# Patient Record
(demographics unavailable — no encounter records)

---

## 2024-11-01 NOTE — DISCUSSION/SUMMARY
[de-identified] : I had an in-depth discussion with the patient regarding his symptoms. On today's exam, his strength has improved, although he still reports intermittent imbalance and leg weakness. We again discussed the role of surgery, including risks and benefits. Overall, he reports improvement since the last visit. He will consider his options and inform me of how he would like to proceed.

## 2024-11-01 NOTE — END OF VISIT
[FreeTextEntry3] : All medical record entries made by the Scribe were at my, Dean Kunz MD, direction and personally dictated by me on 11/01/2024. I have reviewed the chart and agree that the record accurately reflects my personal performance of the history, physical exam, assessment and plan. I have also personally directed, reviewed, and agreed with the chart. [Time Spent: ___ minutes] : I have spent [unfilled] minutes of time on the encounter which excludes teaching and separately reported services.

## 2024-11-01 NOTE — ADDENDUM
[FreeTextEntry1] : I, Kathy Blackwell, documented this note as a scribe on behalf of Dean Kunz MD. on 11/01/2024.

## 2024-11-01 NOTE — PHYSICAL EXAM
[Antalgic] : antalgic [de-identified] : Examination of the lumbar spine reveals no midline tenderness palpation, step-offs, or skin lesions. Decreased range of motion with respect to flexion, extension, lateral bending, and rotation. No tenderness to palpation of the sciatic notch. No tenderness palpation of the bilateral greater trochanters. No pain with passive internal/external rotation of the hips. No instability of bilateral lower extremities.  Negative BHARTI. Negative straight leg raise bilaterally. No bowstring. Negative femoral stretch.  Grossly preserved strength with exception of 3-5 right tibialis anterior and EHL. Grossly intact sensation to light touch bilateral lower extremities. 1+ patellar and Achilles reflexes. Downgoing Babinski. No clonus. Intact proprioception. Palpable pulses. No skin lesion and no edema on the right and left lower extremities. [de-identified] : We reviewed his recent MRI, which shows a persistent right-sided L5-S1 disc herniation.

## 2024-11-01 NOTE — HISTORY OF PRESENT ILLNESS
[de-identified] : Mr. MILTON CARBAJAL  is a 87 year old male who presents to the office for a follow-up visit.  He is here to review MRI results.